# Patient Record
(demographics unavailable — no encounter records)

---

## 2017-02-16 NOTE — NUR
Pt c/o left mid back pain intermittent sharp x1 month. States that it hurts to 
breath when the pain is here. Has not taken any OTC medication for pain. No 
acute distress. Breathing even, unlabored. Denies abd pain, denies n/v/d. 
Denies chest pain.

## 2017-02-16 NOTE — NUR
Patient resting quietly.  No acute distress noted.  Vital signs within normal 
range. States pain is better 4/10

## 2017-02-16 NOTE — NUR
Medication reconciliation completed with information provided by patient. Any 
prior medication reconciliation on file was reviewed and corrected.

## 2017-02-16 NOTE — NUR
Patient given written and verbal discharge instructions and verbalizes 
understanding.  ER MD discussed with patient the results and treatment 
provided. Patient in stable condition. ID arm band removed. 

No Rx given. Patient educated on pain management and to follow up with PMD. 
Pain Scale 4/10.

Opportunity for questions provided and answered.

## 2017-07-19 NOTE — NUR
Patient given written and verbal discharge instructions and verbalizes 
understanding.  ER MD discussed with patient the results and treatment 
provided. Patient in stable condition. ID arm band removed. IV catheter removed 
intact and dressing applied, no active bleeding.

No RX given. Patient educated on pain management and to follow up with PMD. 
Pain Scale 5.

Opportunity for questions provided and answered.

## 2017-07-19 NOTE — NUR
Patient back from CT scan in stable condition, patient able to ambulate to 
bathroom with slow, steady gait. Will contine to observe and assess.

## 2017-07-19 NOTE — NUR
Pt complains of abdominal pain that radiates to her back and left leg above the 
knee. Pt states the pain has been there since last Sunday after a stress 
incontinence surgery. Pt followed up with the surgeon on Thursday, he ordered 
an US but still has not received it and the pain has gotten worse 9/10. Denies 
n/v or fever. Pt has a varicose vein ulcer on left lower leg. No other 
complaints per pt or noted.

## 2021-03-17 NOTE — NUR
Patient given written and verbal discharge instructions and verbalizes 
understanding. DR. ZOHAIB SAGASTUME MD discussed with patient the results and treatment 
provided. Patient in stable condition. ID arm band removed.

Patient educated on pain management and to follow up with PMD. Pain Scale 2/10.

Opportunity for questions provided and answered.

## 2021-09-19 NOTE — NUR
Patient given written and verbal discharge instructions and verbalizes 
understanding.  ER MD discussed with patient the results and treatment 
provided. Patient in stable condition. ID arm band removed. 

Rx of ABX given. Patient educated on pain management and to follow up with PMD. 
Pain Scale 0/10

Opportunity for questions provided and answered. Medication side effect fact 
sheet provided.

## 2021-09-19 NOTE — NUR
RECEIVED AND IN ROOM 2. CALM, ALERT, RESP UNLABORED, SKIN WARM AND DRY. 
COMMUNICATES CLEARLY IN FULL COMPLETE SENTECES, STEADY GAIT. NO DISTRESS